# Patient Record
Sex: MALE | Race: BLACK OR AFRICAN AMERICAN | Employment: UNEMPLOYED | URBAN - METROPOLITAN AREA
[De-identification: names, ages, dates, MRNs, and addresses within clinical notes are randomized per-mention and may not be internally consistent; named-entity substitution may affect disease eponyms.]

---

## 2018-01-29 ENCOUNTER — IP HISTORICAL/CONVERTED ENCOUNTER (OUTPATIENT)
Dept: OTHER | Age: 43
End: 2018-01-29

## 2021-03-29 ENCOUNTER — OFFICE VISIT (OUTPATIENT)
Dept: NEUROLOGY | Age: 46
End: 2021-03-29
Payer: MEDICARE

## 2021-03-29 VITALS
SYSTOLIC BLOOD PRESSURE: 118 MMHG | OXYGEN SATURATION: 98 % | DIASTOLIC BLOOD PRESSURE: 80 MMHG | WEIGHT: 179 LBS | HEART RATE: 92 BPM | HEIGHT: 67 IN | BODY MASS INDEX: 28.09 KG/M2

## 2021-03-29 DIAGNOSIS — F48.2 PBA (PSEUDOBULBAR AFFECT): Primary | ICD-10-CM

## 2021-03-29 PROCEDURE — G9717 DOC PT DX DEP/BP F/U NT REQ: HCPCS | Performed by: SPECIALIST

## 2021-03-29 PROCEDURE — 99204 OFFICE O/P NEW MOD 45 MIN: CPT | Performed by: SPECIALIST

## 2021-03-29 PROCEDURE — G8427 DOCREV CUR MEDS BY ELIG CLIN: HCPCS | Performed by: SPECIALIST

## 2021-03-29 PROCEDURE — G8419 CALC BMI OUT NRM PARAM NOF/U: HCPCS | Performed by: SPECIALIST

## 2021-03-29 NOTE — LETTER
3/29/2021 Patient: Savi Fatima YOB: 1975 Date of Visit: 3/29/2021 Kendra Lou MD 
[address] [address] Via Fax: [phone] Jagdish Nevarez MD 
[address] [place] [address] [place] [address] Via Fax: [phone] Dear MD Jagdish Vang MD, Thank you for referring Mr. Savi Fatima to [facility] for evaluation. My notes for this consultation are attached. If you have questions, please do not hesitate to call me. I look forward to following your patient along with you.  
 
 
Sincerely, 
 
Clarence Beasley MD

## 2021-03-29 NOTE — PATIENT INSTRUCTIONS
Patient history reviewed patient examined. Respect the decision not to add the Nuedexta because Prozac is already in place. Do not want to potentially face down the serotonin syndrome. Good luck with everything and I respect the mom's ability to know his symptom list and to avoid any unnecessary complications. Good luck.

## 2021-03-29 NOTE — PROGRESS NOTES
1. Have you been to the ER, urgent care clinic since your last visit? Hospitalized since your last visit? [facility]    2. Have you seen or consulted any other health care providers outside of the [address] [place] [address] since your last visit? Include any pap smears or colon screening. [facility]    Patient unable to articulate why he is here.   Went to [facility] to have a surgery and passed out, could not tell me the date    Visit Vitals  /80 (BP 1 Location: Left upper arm, BP Patient Position: Sitting)   Pulse 92   Ht 5' 7\" (1.702 m)   Wt 81.2 kg (179 lb)   SpO2 98%   BMI 28.04 kg/m²

## 2021-03-29 NOTE — PROGRESS NOTES
Neurology Consult      Subjective: Missy Morel is a 50 y.o. male who comes in today with his mom. Saw him last on November 12, 2015. The issue at the time was background learning disability ADD and anxiety and pseudobulbar affect with spontaneous crying and occasional snickering. Due to financial constraints the medication Nuedexta could not be put into place. Since that time the mom says she does notice occasional very self-limited giggles and such but no crying. At this time is on Prozac and this helps with depression and anxiety tremendously. The mother has decided with the possibility of introducing serotonin syndrome by mixing the Nuedexta with Prozac, she would rather forego the Nuedexta. Incidentally was at [facility] recently for a scalp cyst and had plastic surgery consultation. This was about 2 weeks ago and everything was going fine. He stood up and wavered and passed out. Was sent down to the ER and everything checked out okay. I think the procedure is going to be lined up for the plastic surgery, and good luck on that score. No other information was discussed today. I understand he is driving and doing well. Current Outpatient Medications   Medication Sig Dispense Refill    FLUoxetine (PROZAC) 10 mg capsule Take  by mouth daily.  lisinopril (PRINIVIL, ZESTRIL) 10 mg tablet Take  by mouth daily.         No Known Allergies  Past Medical History:   Diagnosis Date    Anxiety     Hypertension     Learning disability     Memory loss       Past Surgical History:   Procedure Laterality Date    HX ORTHOPAEDIC        Social History     Socioeconomic History    Marital status: SINGLE     Spouse name: Not on file    Number of children: Not on file    Years of education: Not on file    Highest education level: Not on file   Occupational History    Not on file   Social Needs    Financial resource strain: Not on file    Food insecurity     Worry: Not on file     Inability: Not on file   [employer] needs     Medical: Not on file     Non-medical: Not on file   Tobacco Use    Smoking status: Never Smoker    Smokeless tobacco: Never Used   Substance and Sexual Activity    Alcohol use: Yes     Alcohol/week: 3.3 standard drinks     Types: 2 Glasses of wine, 2 Cans of beer per week    Drug use: No    Sexual activity: Not on file   Lifestyle    Physical activity     Days per week: Not on file     Minutes per session: Not on file    Stress: Not on file   Relationships    Social connections     Talks on phone: Not on file     Gets together: Not on file     Attends [employer] service: Not on file     Active member of club or organization: Not on file     Attends meetings of clubs or organizations: Not on file     Relationship status: Not on file    Intimate partner violence     Fear of current or ex partner: Not on file     Emotionally abused: Not on file     Physically abused: Not on file     Forced sexual activity: Not on file   Other Topics Concern    Not on file   Social History Narrative    Not on file      Family History   Problem Relation Age of Onset    Cancer Father     Stroke Maternal Grandmother     Mental Retardation Other       Visit Vitals  /80 (BP 1 Location: Left upper arm, BP Patient Position: Sitting)   Pulse 92   Ht 5' 7\" (1.702 m)   Wt 81.2 kg (179 lb)   SpO2 98%   BMI 28.04 kg/m²        Review of Systems:   A comprehensive review of systems was negative except for that written in the HPI. Neuro Exam:     Appearance: The patient is well developed, well nourished, provides a partial coherent history and is in no acute distress. Mental Status: Oriented to time, place and person. Mood and affect appropriate. Cranial Nerves:   Intact visual fields. Fundi are benign. SAMARIA, EOM's full, no nystagmus, no ptosis. Facial sensation is normal. Corneal reflexes are intact. Facial movement is symmetric. Hearing is normal bilaterally.  Palate is midline with normal sternocleidomastoid and trapezius muscles are normal. Tongue is midline. Motor:  5/5 strength in upper and lower proximal and distal muscles. Normal bulk and tone. No fasciculations. Reflexes:   Deep tendon reflexes 2+/4 and symmetrical.   Sensory:   Normal to touch, pinprick and vibration. Gait:  Normal gait. Tremor:   No tremor noted. Cerebellar:  No cerebellar signs present. Neurovascular:  Normal heart sounds and regular rhythm, peripheral pulses intact, and no carotid bruits. Assessment:   Pseudobulbar affect. I do respect the mom's concern about the possible serotonin syndrome outcome with mixing a drug like Nuedexta with his Prozac. She thinks currently the PBA effects such as self-limited infrequent snickering, is manageable. If things change certainly no where to find me. Plan:   Revisit as needed.   Signed by :  Yoni Black MD